# Patient Record
(demographics unavailable — no encounter records)

---

## 2024-10-29 NOTE — REASON FOR VISIT
[Follow-Up Visit] : a follow-up visit for [Foot Pain] : foot pain [Ingrown Nail] : ingrown nail [Family Member] : family member

## 2024-11-01 NOTE — ASSESSMENT
[FreeTextEntry1] : Impression: Onychomycosis, painful (B35.1) (M79.671, M79.672).  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    Treatment: The bilateral 2nd digit nails and right 3rd digit nail were debrided of excessive thickness and length to appropriate levels of comfort and contour using sterile nippers and Dremel.   The incurvated nail edges were resected via slant backs.  No clinical signs of infection.  The procedure was tolerated well without complications. Return: 3 months.

## 2024-11-01 NOTE — PHYSICAL EXAM
[Ankle Swelling Bilaterally] : bilaterally  [0] : left foot posterior tibialis 0 [2+] : left foot dorsalis pedis 2+ [Vibration Dec.] : diminished vibratory sensation at the level of the toes [Position Sense Dec.] : diminished position sense at the level of the toes [Diminished Throughout Right Foot] : diminished sensation with monofilament testing throughout right foot [Diminished Throughout Left Foot] : diminished sensation with monofilament testing throughout left foot [Ankle Swelling (On Exam)] : not present [Varicose Veins Of Lower Extremities] : not present [de-identified] : Semi-rigid contracted hammertoes 2 through 5 with the 2nd hammertoe being rigid and dislocated dorsally. 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 Pedal joint ROM is normal, bilateral. Decreased 1st through 5th MPJ ROM, non-painful, non-crepitant. \par  Pedal muscle strength: 5/5, bilateral.  [FreeTextEntry1] : Pedal hair growth: Absent.  Skin: Thin, atrophic. Painful, elongated, dystrophic, yellowing with subungual debris affecting bilateral 2nd digit pincered nails with incurvated nail borders.  Right 3rd digit is thickened to 3mm.  The remaining digits are mildly thickened with dystrophy without subungual debris.  No signs of bacterial infection. No open lesions.

## 2024-11-01 NOTE — HISTORY OF PRESENT ILLNESS
[FreeTextEntry1] : Patient returns today with family for follow up and management of onychomycotic, painful, thickened, elongated nails that they cannot care for themselves.  Patient denies any new medical changes.

## 2024-11-01 NOTE — PHYSICAL EXAM
[Ankle Swelling Bilaterally] : bilaterally  [0] : left foot posterior tibialis 0 [2+] : left foot dorsalis pedis 2+ [Vibration Dec.] : diminished vibratory sensation at the level of the toes [Position Sense Dec.] : diminished position sense at the level of the toes [Diminished Throughout Right Foot] : diminished sensation with monofilament testing throughout right foot [Diminished Throughout Left Foot] : diminished sensation with monofilament testing throughout left foot [Ankle Swelling (On Exam)] : not present [Varicose Veins Of Lower Extremities] : not present [de-identified] : Semi-rigid contracted hammertoes 2 through 5 with the 2nd hammertoe being rigid and dislocated dorsally. 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 Pedal joint ROM is normal, bilateral. Decreased 1st through 5th MPJ ROM, non-painful, non-crepitant. \par  Pedal muscle strength: 5/5, bilateral.  [FreeTextEntry1] : Pedal hair growth: Absent.  Skin: Thin, atrophic. Painful, elongated, dystrophic, yellowing with subungual debris affecting bilateral 2nd digit pincered nails with incurvated nail borders.  Right 3rd digit is thickened to 3mm.  The remaining digits are mildly thickened with dystrophy without subungual debris.  No signs of bacterial infection. No open lesions.

## 2024-11-01 NOTE — PHYSICAL EXAM
[Ankle Swelling Bilaterally] : bilaterally  [0] : left foot posterior tibialis 0 [2+] : left foot dorsalis pedis 2+ [Vibration Dec.] : diminished vibratory sensation at the level of the toes [Position Sense Dec.] : diminished position sense at the level of the toes [Diminished Throughout Right Foot] : diminished sensation with monofilament testing throughout right foot [Diminished Throughout Left Foot] : diminished sensation with monofilament testing throughout left foot [Ankle Swelling (On Exam)] : not present [Varicose Veins Of Lower Extremities] : not present [de-identified] : Semi-rigid contracted hammertoes 2 through 5 with the 2nd hammertoe being rigid and dislocated dorsally. 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 Pedal joint ROM is normal, bilateral. Decreased 1st through 5th MPJ ROM, non-painful, non-crepitant. \par  Pedal muscle strength: 5/5, bilateral.  [FreeTextEntry1] : Pedal hair growth: Absent.  Skin: Thin, atrophic. Painful, elongated, dystrophic, yellowing with subungual debris affecting bilateral 2nd digit pincered nails with incurvated nail borders.  Right 3rd digit is thickened to 3mm.  The remaining digits are mildly thickened with dystrophy without subungual debris.  No signs of bacterial infection. No open lesions.

## 2025-01-29 NOTE — REASON FOR VISIT
[Follow-Up Visit] : a follow-up visit for [Onychomycosis] : onychomycosis [Family Member] : family member

## 2025-01-30 NOTE — PHYSICAL EXAM
[Ankle Swelling Bilaterally] : bilaterally  [0] : left foot posterior tibialis 0 [2+] : left foot dorsalis pedis 2+ [Vibration Dec.] : diminished vibratory sensation at the level of the toes [Position Sense Dec.] : diminished position sense at the level of the toes [Diminished Throughout Right Foot] : diminished sensation with monofilament testing throughout right foot [Diminished Throughout Left Foot] : diminished sensation with monofilament testing throughout left foot [Ankle Swelling (On Exam)] : not present [Varicose Veins Of Lower Extremities] : not present [de-identified] : Semi-rigid contracted hammertoes 2 through 5 with the 2nd hammertoe being rigid and dislocated dorsally. 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 Pedal joint ROM is normal, bilateral. Decreased 1st through 5th MPJ ROM, non-painful, non-crepitant. \par  Pedal muscle strength: 5/5, bilateral.  [FreeTextEntry1] : Pedal hair growth: Absent.  Skin: Thin, atrophic. Painful, elongated, dystrophic, yellowing with subungual debris affecting bilateral 2nd digit pincered nails with incurvated nail borders.  Right 3rd digit is thickened to 3mm.  The remaining digits are mildly thickened with dystrophy without subungual debris.  No signs of bacterial infection. No open lesions.

## 2025-01-30 NOTE — PHYSICAL EXAM
[Ankle Swelling Bilaterally] : bilaterally  [0] : left foot posterior tibialis 0 [2+] : left foot dorsalis pedis 2+ [Vibration Dec.] : diminished vibratory sensation at the level of the toes [Position Sense Dec.] : diminished position sense at the level of the toes [Diminished Throughout Right Foot] : diminished sensation with monofilament testing throughout right foot [Diminished Throughout Left Foot] : diminished sensation with monofilament testing throughout left foot [Ankle Swelling (On Exam)] : not present [Varicose Veins Of Lower Extremities] : not present [de-identified] : Semi-rigid contracted hammertoes 2 through 5 with the 2nd hammertoe being rigid and dislocated dorsally. 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 Pedal joint ROM is normal, bilateral. Decreased 1st through 5th MPJ ROM, non-painful, non-crepitant. \par  Pedal muscle strength: 5/5, bilateral.  [FreeTextEntry1] : Pedal hair growth: Absent.  Skin: Thin, atrophic. Painful, elongated, dystrophic, yellowing with subungual debris affecting bilateral 2nd digit pincered nails with incurvated nail borders.  Right 3rd digit is thickened to 3mm.  The remaining digits are mildly thickened with dystrophy without subungual debris.  No signs of bacterial infection. No open lesions.

## 2025-01-30 NOTE — PHYSICAL EXAM
[Ankle Swelling Bilaterally] : bilaterally  [0] : left foot posterior tibialis 0 [2+] : left foot dorsalis pedis 2+ [Vibration Dec.] : diminished vibratory sensation at the level of the toes [Position Sense Dec.] : diminished position sense at the level of the toes [Diminished Throughout Right Foot] : diminished sensation with monofilament testing throughout right foot [Diminished Throughout Left Foot] : diminished sensation with monofilament testing throughout left foot [Ankle Swelling (On Exam)] : not present [Varicose Veins Of Lower Extremities] : not present [de-identified] : Semi-rigid contracted hammertoes 2 through 5 with the 2nd hammertoe being rigid and dislocated dorsally. 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 Pedal joint ROM is normal, bilateral. Decreased 1st through 5th MPJ ROM, non-painful, non-crepitant. \par  Pedal muscle strength: 5/5, bilateral.  [FreeTextEntry1] : Pedal hair growth: Absent.  Skin: Thin, atrophic. Painful, elongated, dystrophic, yellowing with subungual debris affecting bilateral 2nd digit pincered nails with incurvated nail borders.  Right 3rd digit is thickened to 3mm.  The remaining digits are mildly thickened with dystrophy without subungual debris.  No signs of bacterial infection. No open lesions.

## 2025-04-25 NOTE — PHYSICAL EXAM
[Ankle Swelling Bilaterally] : bilaterally  [0] : left foot posterior tibialis 0 [2+] : left foot dorsalis pedis 2+ [Vibration Dec.] : diminished vibratory sensation at the level of the toes [Position Sense Dec.] : diminished position sense at the level of the toes [Diminished Throughout Right Foot] : diminished sensation with monofilament testing throughout right foot [Diminished Throughout Left Foot] : diminished sensation with monofilament testing throughout left foot [Ankle Swelling (On Exam)] : not present [Varicose Veins Of Lower Extremities] : not present [de-identified] : Semi-rigid contracted hammertoes 2 through 5 with the 2nd hammertoe being rigid and dislocated dorsally. 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 Pedal joint ROM is normal, bilateral. Decreased 1st through 5th MPJ ROM, non-painful, non-crepitant. \par  Pedal muscle strength: 5/5, bilateral.  [FreeTextEntry1] : Pedal hair growth: Absent.  Skin: Thin, atrophic. Painful, elongated, dystrophic, yellowing with subungual debris affecting bilateral 2nd digit pincered nails with incurvated nail borders.  Right 3rd digit is thickened to 3mm.  The remaining digits are mildly thickened with dystrophy without subungual debris.  No signs of bacterial infection. No open lesions.

## 2025-04-25 NOTE — PHYSICAL EXAM
[Ankle Swelling Bilaterally] : bilaterally  [0] : left foot posterior tibialis 0 [2+] : left foot dorsalis pedis 2+ [Vibration Dec.] : diminished vibratory sensation at the level of the toes [Position Sense Dec.] : diminished position sense at the level of the toes [Diminished Throughout Right Foot] : diminished sensation with monofilament testing throughout right foot [Diminished Throughout Left Foot] : diminished sensation with monofilament testing throughout left foot [Ankle Swelling (On Exam)] : not present [Varicose Veins Of Lower Extremities] : not present [de-identified] : Semi-rigid contracted hammertoes 2 through 5 with the 2nd hammertoe being rigid and dislocated dorsally. 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 Pedal joint ROM is normal, bilateral. Decreased 1st through 5th MPJ ROM, non-painful, non-crepitant. \par  Pedal muscle strength: 5/5, bilateral.  [FreeTextEntry1] : Pedal hair growth: Absent.  Skin: Thin, atrophic. Painful, elongated, dystrophic, yellowing with subungual debris affecting bilateral 2nd digit pincered nails with incurvated nail borders.  Right 3rd digit is thickened to 3mm.  The remaining digits are mildly thickened with dystrophy without subungual debris.  No signs of bacterial infection. No open lesions.

## 2025-04-25 NOTE — PHYSICAL EXAM
[Ankle Swelling Bilaterally] : bilaterally  [0] : left foot posterior tibialis 0 [2+] : left foot dorsalis pedis 2+ [Vibration Dec.] : diminished vibratory sensation at the level of the toes [Position Sense Dec.] : diminished position sense at the level of the toes [Diminished Throughout Right Foot] : diminished sensation with monofilament testing throughout right foot [Diminished Throughout Left Foot] : diminished sensation with monofilament testing throughout left foot [Ankle Swelling (On Exam)] : not present [Varicose Veins Of Lower Extremities] : not present [de-identified] : Semi-rigid contracted hammertoes 2 through 5 with the 2nd hammertoe being rigid and dislocated dorsally. 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 Pedal joint ROM is normal, bilateral. Decreased 1st through 5th MPJ ROM, non-painful, non-crepitant. \par  Pedal muscle strength: 5/5, bilateral.  [FreeTextEntry1] : Pedal hair growth: Absent.  Skin: Thin, atrophic. Painful, elongated, dystrophic, yellowing with subungual debris affecting bilateral 2nd digit pincered nails with incurvated nail borders.  Right 3rd digit is thickened to 3mm.  The remaining digits are mildly thickened with dystrophy without subungual debris.  No signs of bacterial infection. No open lesions.